# Patient Record
Sex: MALE | Race: BLACK OR AFRICAN AMERICAN | URBAN - METROPOLITAN AREA
[De-identification: names, ages, dates, MRNs, and addresses within clinical notes are randomized per-mention and may not be internally consistent; named-entity substitution may affect disease eponyms.]

---

## 2022-03-16 ENCOUNTER — OFFICE VISIT (OUTPATIENT)
Dept: NEUROLOGY | Age: 42
End: 2022-03-16
Payer: OTHER GOVERNMENT

## 2022-03-16 DIAGNOSIS — M54.16 LUMBAR RADICULOPATHY: Primary | ICD-10-CM

## 2022-03-16 PROCEDURE — 95909 NRV CNDJ TST 5-6 STUDIES: CPT | Performed by: PSYCHIATRY & NEUROLOGY

## 2022-03-16 PROCEDURE — 95886 MUSC TEST DONE W/N TEST COMP: CPT | Performed by: PSYCHIATRY & NEUROLOGY

## 2022-03-16 NOTE — PROCEDURES
EMG/ NCS Report  [facility] - INPATIENT  [address] [facility] [address]   Ph: [phone]   FAX: [phone]/ [phone]    Test Date:  3/16/2022    Patient: Parris Sierra : 1980 Physician: Iwona Tabares MD   ID#: [id number] SEX: Male Ref. Phys: Campos Monreal MD   Tech: Аннаbrenda Sherman    Patient History / Exam:  Patient complaining of right low back pain radiating to the posterior leg and foot. Assess for neuropathy vs lumbar radiculopathy. EMG & NCV Findings:  Sensory and motor nerve conduction studies (as indicated in the tables) were within reference of normal.  All F Wave latencies were within normal limits. Disposable concentric needle EMG (as indicated in the table) revealed mild denervation changes with unsustained positive sharp waves and mild neuropathic recruitment changes right tibialis anterior, peroneus longus, medial gastrocnemius and TFL muscle. Mild denervation and unsustained positive sharp wave right lower lumbar paraspinal muscle. Impressions: This study is mildly abnormal.  There is electrodiagnostic evidence of a.mild right L5-S1 radiculopathy. There is no evidence of a generalized neuropathy or myopathy at this time. Thank you for the consult.     Ihsan Tamayo MD    Nerve Conduction Studies  Anti Sensory Summary Table     Stim Site NR Peak (ms) Norm Peak (ms) P-T Amp (µV) Norm P-T Amp Site1 Site2 Dist (cm)   Right Sup Fibular Anti Sensory (Lat ankle)  32.1 °C   Lower leg    2.6 <4.5 17.9 >5 Lower leg Lat ankle 10.0   Site 2    2.7  17.9       Site 3    2.6  16.8       Right Sural Anti Sensory (Lat Mall)  31.3 °C   Calf    3.2 <4.5 9.1 >4.0 Calf Lat Mall 14.0   Site 2    3.1  8.1       Site 3    3.0  10.6         Motor Summary Table     Stim Site NR Onset (ms) Norm Onset (ms) O-P Amp (mV) Norm O-P Amp Amp (Prev) (%) Site1 Site2 Dist (cm) Babak (m/s) Norm Babak (m/s)   Right Fibular Motor (Ext Dig Brev) 32.3 °C   Ankle    3.4 <6.5 4.6 >2.6 100.0 Ankle Ext Dig Brev 8.0     B Fib    10.9  3.8  82.6 B Fib Ankle 34.0 45 >38   Poplt    12.3  4.3  113.2 Poplt B Fib 10.0 71 >42   Right Tibial Motor (Abd Ortiz Brev)  32.3 °C   Ankle    3.5 <6.1 12.0 >5.3 100.0 Ankle Abd Ortiz Brev 8.0     Knee    12.3  8.3  69.2 Knee Ankle 40.0 45 >39     F Wave Studies     NR F-Lat (ms) Lat Norm (ms) L-R F-Lat (ms) L-R Lat Norm   Right Tibial (Mrkrs) (Abd Hallucis)  32.4 °C      48.09 <56  <5.7     H Reflex Studies     NR H-Lat (ms) L-R H-Lat (ms) L-R Lat Norm   Right Tibial (Gastroc)  32.3 °C      31.26  <2.0       EMG     Side Muscle Nerve Root Ins Act Fibs Psw Recrt Duration Amp Poly Comment   Right VastusLat Femoral L2-4 Nml Nml Nml Nml Nml Nml Nml    Right MedGastroc Tibial S1-2 Incr Nml 1+ Reduced Incr Incr 1+    Right AntTibialis Dp Br Peron L4-5 Incr Nml 1+ Reduced Incr Incr 1+    Right Peroneus Long   Incr Nml 1+ Reduced Incr Incr 1+    Right TensorFascLat SupGluteal L4-5, S1 Incr Nml 1+ Reduced Incr Incr 1+    Right Lower Lumb Parasp Rami L5,S1 Incr Nml 1+ Nml Nml Nml Nml    Right Mid Lumb Parasp Rami L4,5 Nml Nml Nml Nml Nml Nml Nml      Waveforms:

## 2025-02-07 ENCOUNTER — ANESTHESIA EVENT (OUTPATIENT)
Facility: HOSPITAL | Age: 45
End: 2025-02-07
Payer: OTHER GOVERNMENT

## 2025-02-07 ENCOUNTER — ANESTHESIA (OUTPATIENT)
Facility: HOSPITAL | Age: 45
End: 2025-02-07
Payer: OTHER GOVERNMENT

## 2025-02-07 ENCOUNTER — HOSPITAL ENCOUNTER (OUTPATIENT)
Facility: HOSPITAL | Age: 45
Setting detail: OUTPATIENT SURGERY
Discharge: HOME | End: 2025-02-07
Attending: INTERNAL MEDICINE | Admitting: INTERNAL MEDICINE
Payer: OTHER GOVERNMENT

## 2025-02-07 VITALS
RESPIRATION RATE: 17 BRPM | WEIGHT: 219.8 LBS | TEMPERATURE: 98.2 F | OXYGEN SATURATION: 99 % | SYSTOLIC BLOOD PRESSURE: 123 MMHG | DIASTOLIC BLOOD PRESSURE: 76 MMHG | BODY MASS INDEX: 30.77 KG/M2 | HEIGHT: 71 IN | HEART RATE: 67 BPM

## 2025-02-07 PROCEDURE — 7100000011 HC PHASE II RECOVERY - ADDTL 15 MIN: Performed by: INTERNAL MEDICINE

## 2025-02-07 PROCEDURE — 88305 TISSUE EXAM BY PATHOLOGIST: CPT

## 2025-02-07 PROCEDURE — 7100000010 HC PHASE II RECOVERY - FIRST 15 MIN: Performed by: INTERNAL MEDICINE

## 2025-02-07 PROCEDURE — 6360000002 HC RX W HCPCS

## 2025-02-07 PROCEDURE — 3600007512: Performed by: INTERNAL MEDICINE

## 2025-02-07 PROCEDURE — 3700000000 HC ANESTHESIA ATTENDED CARE: Performed by: INTERNAL MEDICINE

## 2025-02-07 PROCEDURE — 3700000001 HC ADD 15 MINUTES (ANESTHESIA): Performed by: INTERNAL MEDICINE

## 2025-02-07 PROCEDURE — 3600007502: Performed by: INTERNAL MEDICINE

## 2025-02-07 RX ORDER — LIDOCAINE HYDROCHLORIDE 20 MG/ML
INJECTION, SOLUTION INTRAVENOUS
Status: DISCONTINUED | OUTPATIENT
Start: 2025-02-07 | End: 2025-02-07 | Stop reason: SDUPTHER

## 2025-02-07 RX ORDER — PROPOFOL 10 MG/ML
INJECTION, EMULSION INTRAVENOUS
Status: DISCONTINUED | OUTPATIENT
Start: 2025-02-07 | End: 2025-02-07 | Stop reason: SDUPTHER

## 2025-02-07 RX ORDER — SODIUM CHLORIDE 9 MG/ML
INJECTION, SOLUTION INTRAVENOUS PRN
Status: DISCONTINUED | OUTPATIENT
Start: 2025-02-07 | End: 2025-02-07 | Stop reason: HOSPADM

## 2025-02-07 RX ORDER — AMLODIPINE AND BENAZEPRIL HYDROCHLORIDE 5; 10 MG/1; MG/1
1 CAPSULE ORAL
COMMUNITY
Start: 2024-03-28

## 2025-02-07 RX ORDER — RIZATRIPTAN BENZOATE 10 MG/1
TABLET ORAL
COMMUNITY

## 2025-02-07 RX ORDER — TRIAMCINOLONE ACETONIDE 1 MG/G
CREAM TOPICAL DAILY PRN
COMMUNITY

## 2025-02-07 RX ORDER — GABAPENTIN 300 MG/1
300 CAPSULE ORAL 2 TIMES DAILY
COMMUNITY

## 2025-02-07 RX ADMIN — LIDOCAINE HYDROCHLORIDE 40 MG: 20 INJECTION, SOLUTION INTRAVENOUS at 13:16

## 2025-02-07 RX ADMIN — PROPOFOL 200 MCG/KG/MIN: 10 INJECTION, EMULSION INTRAVENOUS at 13:17

## 2025-02-07 RX ADMIN — PROPOFOL 100 MG: 10 INJECTION, EMULSION INTRAVENOUS at 13:16

## 2025-02-07 ASSESSMENT — PAIN - FUNCTIONAL ASSESSMENT: PAIN_FUNCTIONAL_ASSESSMENT: 0-10

## 2025-02-07 ASSESSMENT — LIFESTYLE VARIABLES: SMOKING_STATUS: 1

## 2025-02-07 NOTE — PROGRESS NOTES
Initial RN admission and assessment performed and documented in Endoscopy navigator.     Patient evaluated by anesthesia in pre-procedure holding.     All procedural vital signs, airway assessment, and level of consciousness information monitored and recorded by anesthesia staff on the anesthesia record.     Report received from CRNA post procedure.  Patient transported to recovery area by RN.    Endoscopy post procedure time out was performed and specimens were verified by physician.    Endoscope was pre-cleaned at bedside immediately following procedure by YAEL Hitchcock.

## 2025-02-07 NOTE — OP NOTE
[facility]  [facility]  Gigi Hermosillo MD  [phone]      2025    Colonoscopy Procedure Note  Ben Turner  :  1980  [place] Medical Record Number: [id number]    Indications:   Screening colonoscopy.  PCP:  None, None  Anesthesia/Sedation: Conscious Sedation/Moderate Sedation/GETA, see notes  Endoscopist:  Dr. Gigi Hermosillo  Complications:  None  Estimated Blood Loss:  None    Permit:  The indications, risks, benefits and alternatives were reviewed with the patient or their decision maker who was provided an opportunity to ask questions and all questions were answered.  The specific risks of colonoscopy with conscious sedation were reviewed, including but not limited to anesthetic complication, bleeding, adverse drug reaction, missed lesion, infection, IV site reactions, and intestinal perforation which would lead to the need for surgical repair.  Alternatives to colonoscopy including radiographic imaging, observation without testing, or laboratory testing were reviewed including the limitations of those alternatives.  After considering the options and having all their questions answered, the patient or their decision maker provided both verbal and written consent to proceed.        Procedure in Detail:  After obtaining informed consent, positioning of the patient in the left lateral decubitus position, and conduction of a pre-procedure pause or \"time out\" the endoscope was introduced into the anus and advanced to the cecum, which was identified by the ileocecal valve and appendiceal orifice.  The quality of the colonic preparation was excellent.  A careful inspection was made as the colonoscope was withdrawn, findings and interventions are described below.    Findings:   KELSEA: Normal.  Rectum: Grade 1 internal hemorrhoids, seen on retroflexed views.  Sigmoid colon: 2 sessile polyps, removed completely with cold biopsy  forceps.  Small size diverticulosis.  Descending colon: Normal.  Transverse colon: Normal.  Ascending colon: Normal.  Cecum: Normal.  Terminal ileum: Not intubated.    Specimens:    See above    Complications:   None; patient tolerated the procedure well.    Impression:  Internal hemorrhoids.  Colon polyps.    Recommendations:   1.  Await pathology results.  2.  Patient can resume all medications and diet.  3.  Colonoscopy interval will depend on results of the biopsies.    Thank you for entrusting me with this patient's care.  Please do not hesitate to contact me with any questions or if I can be of assistance with any of your other patients' GI needs.    Signed By: Gigi Hermosillo MD                        February 7, 2025      Surgical assistant none.  Implants none unless specified.

## 2025-02-07 NOTE — DISCHARGE INSTRUCTIONS
[facility]  [facility]  Gigi Hermosillo MD  [phone]      February 7, 2025    Ben Turner  YOB: 1980    ENDOSCOPY DISCHARGE INSTRUCTIONS    If there is redness at IV site you should apply warm compress to area.  If redness or soreness persist contact your primary care doctor.    There may be a slight amount of blood passed from the rectum.  Gaseous discomfort may develop, but walking, belching will help relieve this.  You may not operate a vehicle for 12 hours  You may not operate machinery or dangerous appliances for rest of today  You may not drink alcoholic beverages for 12 hours  Avoid making any critical decisions for 24 hours    DIET:  You may resume your normal diet, but some patients find that heavy or large meals may lead to indigestion or vomiting.  I suggest a light meal as first food intake.    MEDICATIONS:  The use of some over-the-counter pain medication may lead to bleeding after colon biopsies or polyp removal.  Tylenol (also called acetaminophen) is safe to take even if you have had colonoscopy with polyp removal.  Based on the procedure you had today you may safely take aspirin or aspirin-like products for the next ten (10) days.  Remember that Tylenol (also called acetaminophen) is safe to take after colonoscopy even if you have had biopsies or polyps removed.    ACTIVITY:  You may resume your normal household activities, but it is recommended that you spend the remainder of the day resting -  avoid any strenuous activity.    CALL DR. HERMOSILLO'S OFFICE IF:  Increasing pain, nausea, vomiting  Abdominal distension (swelling)  Significant new or increased bleeding (oral or rectal)  Fever/Chills  Chest pain/shortness of breath                       Additional instructions:   Impression:  Internal hemorrhoids.  Colon polyps.    Recommendations:   1.  Await pathology results.  2.  Patient can resume all medications

## 2025-02-07 NOTE — ANESTHESIA PRE PROCEDURE
Department of Anesthesiology  Preprocedure Note       Name:  Ben Turner   Age:  44 y.o.  :  1980                                          MRN:  [id number]         Date:  2025      Surgeon: Surgeon(s):  Gigi Hermosillo MD    Procedure: Procedure(s):  COLONOSCOPY    Medications prior to admission:   Prior to Admission medications    Medication Sig Start Date End Date Taking? Authorizing Provider   amLODIPine-benazepril (LOTREL) 5-10 MG per capsule Take 1 capsule by mouth 3/28/24  Yes ProviderTerrell MD   diclofenac sodium (VOLTAREN) 1 % GEL See instructions, Apply 2 grams (upper extremities) or 4 grams (lower extremities) to affected area topically four times daily as needed for pain. Max total body dose of 32 grams per day, # 100 g, 5 total refill(s), Maintenance, [facility] Yes Terrell Williamson MD   rizatriptan (MAXALT) 10 MG tablet    Yes ProviderTerrell MD   triamcinolone (KENALOG) 0.1 % cream Apply topically daily as needed   Yes Provider, MD Terrell   gabapentin (NEURONTIN) 300 MG capsule Take 1 capsule by mouth 2 times daily.    Provider, MD Terrell       Current medications:    No current facility-administered medications for this encounter.       Allergies:  No Known Allergies    Problem List:  There is no problem list on file for this patient.      Past Medical History:        Diagnosis Date   • Hypertension    • Neuropathy        Past Surgical History:        Procedure Laterality Date   • BACK SURGERY         Social History:    Social History     Tobacco Use   • Smoking status: Never   • Smokeless tobacco: Current   Substance Use Topics   • Alcohol use: Yes     Comment: 4-5 drinks 1x per week                                Ready to quit: Not Answered  Counseling given: Not Answered      Vital Signs (Current):   Vitals:    25 1202   BP: 131/77   Pulse: 71   Resp: 17   Temp: 98.2 °F (36.8 °C)   TempSrc: Oral   SpO2: 97%

## 2025-02-07 NOTE — PROGRESS NOTES
Pt requests medical record be sent to [facility] (Dr. Aguilar); discussed w/ Dr. Hermosillo and instructed pt to give fax number to Dr. Hermosillo's office when he gets his path report. Pt states understanding.

## 2025-02-07 NOTE — H&P
Pre-Endoscopy H&P Update    Chief complaint/HPI/ROS:    The indication for the procedure, the patient's history and the patient's current medications are reviewed prior to the procedure and that data is reported on the H&P to which this document is attached.  Any significant complaints with regard to organ systems will be noted, and if not mentioned then a review of systems is not contributory.    Past Medical History:   Diagnosis Date    Hypertension     Neuropathy       Past Surgical History:   Procedure Laterality Date    BACK SURGERY       Social   Social History     Tobacco Use    Smoking status: Never    Smokeless tobacco: Current   Substance Use Topics    Alcohol use: Yes     Comment: 4-5 drinks 1x per week      History reviewed. No pertinent family history.   No Known Allergies   Prior to Admission Medications   Prescriptions Last Dose Informant Patient Reported? Taking?   amLODIPine-benazepril (LOTREL) 5-10 MG per capsule 2/7/2025  Yes Yes   Sig: Take 1 capsule by mouth   diclofenac sodium (VOLTAREN) 1 % GEL Past Week  Yes Yes   Sig: See instructions, Apply 2 grams (upper extremities) or 4 grams (lower extremities) to affected area topically four times daily as needed for pain. Max total body dose of 32 grams per day, # 100 g, 5 total refill(s), Maintenance, [facility]   gabapentin (NEURONTIN) 300 MG capsule 1/24/2025  Yes No   Sig: Take 1 capsule by mouth 2 times daily.   rizatriptan (MAXALT) 10 MG tablet Past Month  Yes Yes   triamcinolone (KENALOG) 0.1 % cream Past Week  Yes Yes   Sig: Apply topically daily as needed      Facility-Administered Medications: None       PHYSICAL EXAM:  The patient is examined immediately prior to the procedure.    Vitals:    02/07/25 1202   BP: 131/77   Pulse: 71   Resp: 17   Temp: 98.2 °F (36.8 °C)   SpO2: 97%       Gen: Appears comfortable, no distress.  Pulm: comfortable respirations with no abnormal audible breath sounds  HEART: well perfused,

## 2025-02-10 NOTE — ANESTHESIA POSTPROCEDURE EVALUATION
Department of Anesthesiology  Postprocedure Note    Patient: Ben Turner  MRN: [id number]  YOB: 1980  Date of evaluation: 2/10/2025    Procedure Summary       Date: 02/07/25 Room / Location: [facility] ENDO 03 / [facility] ENDOSCOPY    Anesthesia Start: 1313 Anesthesia Stop: 1334    Procedures:       COLONOSCOPY (Lower GI Region)      COLONOSCOPY POLYPECTOMY SNARE/BIOPSY (Lower GI Region) Diagnosis:       Special screening for malignant neoplasms, colon      (Special screening for malignant neoplasms, colon [Z12.11])    Surgeons: Gigi Hermosillo MD Responsible Provider: Ani Claire MD    Anesthesia Type: MAC ASA Status: 2            Anesthesia Type: MAC    Oliver Phase I: Oliver Score: 10    Oliver Phase II: Oliver Score: 10    Anesthesia Post Evaluation    Patient location during evaluation: PACU  Patient participation: complete - patient participated  Level of consciousness: awake  Airway patency: patent  Nausea & Vomiting: no vomiting and no nausea  Cardiovascular status: hemodynamically stable  Respiratory status: acceptable  Hydration status: stable  Pain management: adequate    No notable events documented.

## 2025-07-25 ENCOUNTER — OFFICE VISIT (OUTPATIENT)
Age: 45
End: 2025-07-25

## 2025-07-25 VITALS
WEIGHT: 219 LBS | DIASTOLIC BLOOD PRESSURE: 76 MMHG | HEART RATE: 67 BPM | BODY MASS INDEX: 30.66 KG/M2 | SYSTOLIC BLOOD PRESSURE: 123 MMHG | OXYGEN SATURATION: 98 % | HEIGHT: 71 IN

## 2025-07-25 DIAGNOSIS — S83.241A TEAR OF MEDIAL MENISCUS OF RIGHT KNEE, CURRENT, UNSPECIFIED TEAR TYPE, INITIAL ENCOUNTER: Primary | ICD-10-CM

## 2025-07-25 DIAGNOSIS — M23.006 PERIMENISCAL CYST OF RIGHT KNEE: ICD-10-CM

## 2025-07-25 ASSESSMENT — PATIENT HEALTH QUESTIONNAIRE - PHQ9
1. LITTLE INTEREST OR PLEASURE IN DOING THINGS: NOT AT ALL
SUM OF ALL RESPONSES TO PHQ QUESTIONS 1-9: 1
SUM OF ALL RESPONSES TO PHQ QUESTIONS 1-9: 1
2. FEELING DOWN, DEPRESSED OR HOPELESS: SEVERAL DAYS
SUM OF ALL RESPONSES TO PHQ QUESTIONS 1-9: 1
SUM OF ALL RESPONSES TO PHQ QUESTIONS 1-9: 1

## 2025-07-25 NOTE — PROGRESS NOTES
Ben Turner (: 1980) is a 45 y.o. male, new patient, here for evaluation of the following chief complaint(s):  New Patient (He has had pain in the right knee for about a year now went to the PCP and they did Imaging. Which shows a MCL tear. )       ASSESSMENT/PLAN:  Below is the assessment and plan developed based on review of pertinent history, physical exam, labs, studies, and medications.  Available imaging was independently reviewed including 3 views of the right knee from an outside facility which were personally reviewed and interpreted by me and showed evidence of mild tricompartmental joint space narrowing.  There is soft tissue swelling along the medial joint line.  MRI of the right knee was personally reviewed and interpreted by me from [facility] which showed evidence of medial meniscus tear with associated parameniscal cyst that is multi loculated and located around the medial joint line and MCL.  MCL appears intact.  ACL, PCL, LCL, PLC are intact.  Lateral meniscus is intact.    Discussed diagnosis of right knee medial meniscus tear and parameniscal cyst with the patient and treatment options.  He has tried nonoperative treatment with rest, activity modification, anti-inflammatories, physical therapy without significant improvement in his symptoms.  Discussed other treatment options including considering a trial of a steroid injection which the patient would like to schedule.  Discussed he could consider surgery with right knee arthroscopy and meniscus repair versus debridement with decompression of the cyst.  The patient wants to hold off on surgery at this time.  The patient will follow-up for the steroid injection.  All questions were answered.  1. Tear of medial meniscus of right knee, current, unspecified tear type, initial encounter  2. Perimeniscal cyst of right knee      Return in about 1 week (around 2025).       SUBJECTIVE/OBJECTIVE:  Ben Turner (: 1980) is a 45

## 2025-07-25 NOTE — PROGRESS NOTES
Identified pt with two pt identifiers (name and ). Reviewed chart in preparation for visit and have obtained necessary documentation.    Ben Turner is a 45 y.o. male New Patient (He has had pain in the right knee for about a year now went to the PCP and they did Imaging. Which shows a MCL tear. )  .    Vitals:    25 0919   BP: 123/76   BP Site: Left Upper Arm   Patient Position: Sitting   BP Cuff Size: Medium Adult   Pulse: 67   SpO2: 98%   Weight: 99.3 kg (219 lb)   Height: 1.803 m (5' 11\")          1. Have you been to the ER, urgent care clinic since your last visit?  Hospitalized since your last visit?  no     2. Have you seen or consulted any other health care providers outside of the [facility] since your last visit?  Include any pap smears or colon screening.  no

## 2025-07-28 ENCOUNTER — OFFICE VISIT (OUTPATIENT)
Age: 45
End: 2025-07-28

## 2025-07-28 DIAGNOSIS — S83.241A TEAR OF MEDIAL MENISCUS OF RIGHT KNEE, CURRENT, UNSPECIFIED TEAR TYPE, INITIAL ENCOUNTER: Primary | ICD-10-CM

## 2025-07-28 RX ORDER — LIDOCAINE HYDROCHLORIDE 10 MG/ML
2 INJECTION, SOLUTION EPIDURAL; INFILTRATION; INTRACAUDAL; PERINEURAL ONCE
Status: COMPLETED | OUTPATIENT
Start: 2025-07-28 | End: 2025-07-28

## 2025-07-28 RX ORDER — DEXAMETHASONE SODIUM PHOSPHATE 4 MG/ML
4 INJECTION, SOLUTION INTRA-ARTICULAR; INTRALESIONAL; INTRAMUSCULAR; INTRAVENOUS; SOFT TISSUE ONCE
Status: COMPLETED | OUTPATIENT
Start: 2025-07-28 | End: 2025-07-28

## 2025-07-28 RX ADMIN — DEXAMETHASONE SODIUM PHOSPHATE 4 MG: 4 INJECTION, SOLUTION INTRA-ARTICULAR; INTRALESIONAL; INTRAMUSCULAR; INTRAVENOUS; SOFT TISSUE at 09:36

## 2025-07-28 RX ADMIN — LIDOCAINE HYDROCHLORIDE 2 ML: 10 INJECTION, SOLUTION EPIDURAL; INFILTRATION; INTRACAUDAL; PERINEURAL at 09:36

## 2025-07-28 NOTE — PATIENT INSTRUCTIONS
Learning About Joint Injections  What are joint injections?     Joint injections are shots into a joint, such as the knee or shoulder. They are used to put in medicines, such as pain relievers and steroid medicines. Steroids can help reduce inflammation. A steroid shot can sometimes help with short-term pain relief when other treatments haven't worked.  How are they done?  First, the area over the joint will be cleaned. Your doctor may then use a tiny needle to numb the skin in the area where you will get the joint injection.  If a tiny needle is used to numb the area, your doctor will use another needle to inject the medicine. Your doctor may use a pain reliever, a steroid, or both. You may feel some pressure or discomfort.  Your doctor may put ice on the area before you go home.  What can you expect after a joint injection?  You will probably go home soon after your shot. You may have numbness around the joint for a few hours.  If your shot included both a pain reliever and a steroid, then the pain will probably go away right away. But it might come back after a few hours. This might happen if the pain reliever wears off and the steroid hasn't started to work yet. Steroids don't always work. But when they do, the pain relief can last for several days to a few months or longer.  Your doctor may tell you to use ice on the area. You can also use ice if the pain comes back. Put ice or a cold pack on your joint for 10 to 20 minutes at a time. Put a thin cloth between the ice and your skin.  Follow your doctor's instructions carefully.  Follow-up care is a key part of your treatment and safety. Be sure to make and go to all appointments, and call your doctor if you are having problems. It's also a good idea to know your test results and keep a list of the medicines you take.  Current as of: July 31, 2024  Content Version: 14.5  © 2024-2025 [employer].   Care instructions adapted under license by [facility]

## 2025-07-28 NOTE — PROGRESS NOTES
Patient is here to undergo right knee steroid injection.    After the risks, benefits, and alternatives were explained, the patient elected to undergo right knee steroid injection.  Risks including infection, steroid flare, hyperglycemia, hypopigmentation were explained.  Informed verbal consent was obtained.  In a sterile manner the patient underwent an injection of a mixture of 2 ml of 1% lidocaine, 2 ml of marcaine and 1 ml of 4 mg dexamethasone.  The patient tolerated the procedure well.

## 2025-09-03 ENCOUNTER — PATIENT MESSAGE (OUTPATIENT)
Age: 45
End: 2025-09-03